# Patient Record
Sex: MALE | Race: ASIAN | NOT HISPANIC OR LATINO | Employment: OTHER | URBAN - METROPOLITAN AREA
[De-identification: names, ages, dates, MRNs, and addresses within clinical notes are randomized per-mention and may not be internally consistent; named-entity substitution may affect disease eponyms.]

---

## 2017-03-24 ENCOUNTER — OFFICE VISIT (OUTPATIENT)
Dept: FAMILY MEDICINE | Facility: CLINIC | Age: 22
End: 2017-03-24

## 2017-03-24 VITALS
SYSTOLIC BLOOD PRESSURE: 129 MMHG | HEIGHT: 61 IN | BODY MASS INDEX: 19.45 KG/M2 | WEIGHT: 103 LBS | HEART RATE: 85 BPM | DIASTOLIC BLOOD PRESSURE: 74 MMHG | TEMPERATURE: 97.7 F

## 2017-03-24 DIAGNOSIS — H90.3 BILATERAL SENSORINEURAL HEARING LOSS: Primary | ICD-10-CM

## 2017-03-24 NOTE — PROGRESS NOTES
Preceptor attestation:  Patient seen and discussed with the resident. Assessment and plan reviewed with resident and agreed upon.  Supervising physician: Dina Gardner  [facility]

## 2017-03-24 NOTE — NURSING NOTE
name: Stephan (Nick) Alison  Language: Angelica  Agency: [employer]  Phone number: [phone]

## 2017-03-24 NOTE — PROGRESS NOTES
"SUBJECTIVE:  Saumya Gerard is a 21 year old male with a PMH of    Patient Active Problem List   Diagnosis     Bilateral sensorineural hearing loss     Tinea cruris     Who presents for evaluation of   Chief Complaint   Patient presents with     other     evaluation of disability     Family needs a note from HCP confirming patient is permanently disabled for Tax information.  [facility] note from 2014 shows patient would not find benefit to communication with hearing aids.  He has been taking ASL for the last 2 years.     He is otherwise without complaint    OBJECTIVE:  /74  Pulse 85  Temp 97.7  F (36.5  C) (Oral)  Ht 5' 0.5\" (153.7 cm)  Wt 103 lb (46.7 kg)  BMI 19.78 kg/m2  EXAM:  Constitutional: healthy, alert and no distress       ASSESSMENT:  Saumya was seen today for other.    Diagnoses and all orders for this visit:    Bilateral sensorineural hearing loss  Letter given, can be found in the record.      Total of 20 minutes was spent in face to face contact with patient with > 50% in counseling and coordination of care.  Options for treatment and/or follow-up care were reviewed with the patient. Saumya Gerard was engaged and actively involved in the decision making process. He verbalized understanding of the options discussed and was satisfied with the final plan.  RTC if develops new or worsening symptoms.    Discussed with Dr Dina Gardner.     Pj Wright MD          "

## 2017-03-24 NOTE — MR AVS SNAPSHOT
After Visit Summary   3/24/2017    Saumya Gerard    MRN: [id number]           Patient Information     Date Of Birth          1995        Visit Information        Provider Department      3/24/2017 9:00 AM Pj Wright MD [facility]        Today's Diagnoses     Bilateral sensorineural hearing loss    -  1       Follow-ups after your visit        Follow-up notes from your care team     Return if symptoms worsen or fail to improve.      Who to contact     Please call your clinic at [phone] to:    Ask questions about your health    Make or cancel appointments    Discuss your medicines    Learn about your test results    Speak to your doctor   If you have compliments or concerns about an experience at your clinic, or if you wish to file a complaint, please contact [facility] Patient Relations at [phone] or email us at [email or web address]         Additional Information About Your Visit        MyChart Information     [employer] is an electronic gateway that provides easy, online access to your medical records. With Ec[employer], you can request a clinic appointment, read your test results, renew a prescription or communicate with your care team.     To sign up for BLiNQ Mediat visit the website at [email or web address]   You will be asked to enter the access code listed below, as well as some personal information. Please follow the directions to create your username and password.     Your access code is: [email or web address]  Expires: 2017  1:01 AM     Your access code will  in 90 days. If you need help or a new code, please contact your [facility] Clinic or call [phone] for assistance.        Care EveryWhere ID     This is your Care EveryWhere ID. This could be used by other organizations to access your [place] medical records  [id number]        Your Vitals Were     Pulse Temperature Height BMI (Body Mass Index)        "   85 97.7  F (36.5  C) (Oral) 5' 0.5\" (153.7 cm) 19.78 kg/m2         Blood Pressure from Last 3 Encounters:   03/24/17 129/74   05/02/16 115/70   01/05/15 131/81    Weight from Last 3 Encounters:   03/24/17 103 lb (46.7 kg)   05/02/16 109 lb 3.2 oz (49.5 kg)   01/05/15 103 lb 11.2 oz (47 kg)              Today, you had the following     No orders found for display       Primary Care Provider Office Phone # Fax #    Emil Martinez,  [phone]       [address] [address]        Thank you!     Thank you for choosing [facility]  for your care. Our goal is always to provide you with excellent care. Hearing back from our patients is one way we can continue to improve our services. Please take a few minutes to complete the written survey that you may receive in the mail after your visit with us. Thank you!             Your Updated Medication List - Protect others around you: Learn how to safely use, store and throw away your medicines at www.disposemymeds.org.          This list is accurate as of: 3/24/17 11:59 PM.  Always use your most recent med list.                   Brand Name Dispense Instructions for use    * terbinafine 250 MG tablet    lamISIL    42 tablet    Take 1 tablet (250 mg) by mouth daily       * terbinafine 250 MG tablet    lamISIL    28 tablet    Take 1 tablet (250 mg) by mouth daily       * Notice:  This list has 2 medication(s) that are the same as other medications prescribed for you. Read the directions carefully, and ask your doctor or other care provider to review them with you.      "

## 2017-03-24 NOTE — LETTER
March 24, 2017        Saumya Gerard  [address]  [place] [address]    To Whom it May Concern:    I am writing to confirm that Saumya Gerard has confirmed permanent disability.  His medical disability is well documented in the medical record.  He has been living with and dependant upon his father, Vicente Sanders, since arriving in the [place].     Sincerely,        Pj Wright MD

## 2017-05-23 ENCOUNTER — DOCUMENTATION ONLY (OUTPATIENT)
Dept: PSYCHOLOGY | Facility: CLINIC | Age: 22
End: 2017-05-23

## 2017-05-23 ENCOUNTER — OFFICE VISIT (OUTPATIENT)
Dept: FAMILY MEDICINE | Facility: CLINIC | Age: 22
End: 2017-05-23

## 2017-05-23 VITALS
HEART RATE: 92 BPM | DIASTOLIC BLOOD PRESSURE: 69 MMHG | TEMPERATURE: 98.1 F | HEIGHT: 61 IN | WEIGHT: 102 LBS | BODY MASS INDEX: 19.26 KG/M2 | SYSTOLIC BLOOD PRESSURE: 108 MMHG

## 2017-05-23 DIAGNOSIS — F43.21 SITUATIONAL DEPRESSION: Primary | ICD-10-CM

## 2017-05-23 NOTE — PATIENT INSTRUCTIONS
We will contact you with psychology resources.    Return in 1 month for follow up or sooner if things are worsening.    If these episodes occur again, try to capture on camera and bring into your next visit.        Sent  team a message to advise for MH referral.  Keisha  05/23/17

## 2017-05-23 NOTE — PROGRESS NOTES
"Preceptor attestation:  /69  Pulse 92  Temp 98.1  F (36.7  C) (Oral)  Ht 5' 0.5\" (153.7 cm)  Wt 102 lb (46.3 kg)  BMI 19.59 kg/m2    Patient seen and discussed with the resident. Assessment and plan reviewed with resident and agreed upon.    Supervising physician: Alison Schmidt MD  [facility]    "

## 2017-05-23 NOTE — MR AVS SNAPSHOT
After Visit Summary   2017    Saumya Gerard    MRN: [id number]           Patient Information     Date Of Birth          1995        Visit Information        Provider Department      2017 2:10 PM Zelda Meyer DO [facility]        Care Instructions    We will contact you with psychology resources.    Return in 1 month for follow up or sooner if things are worsening.    If these episodes occur again, try to capture on camera and bring into your next visit.        Follow-ups after your visit        Who to contact     Please call your clinic at [phone] to:    Ask questions about your health    Make or cancel appointments    Discuss your medicines    Learn about your test results    Speak to your doctor   If you have compliments or concerns about an experience at your clinic, or if you wish to file a complaint, please contact [facility] Patient Relations at [phone] or email us at [email or web address]         Additional Information About Your Visit        MyChart Information     [employer] is an electronic gateway that provides easy, online access to your medical records. With [employer]RSB Groupe, you can request a clinic appointment, read your test results, renew a prescription or communicate with your care team.     To sign up for GMG33t visit the website at [email or web address]   You will be asked to enter the access code listed below, as well as some personal information. Please follow the directions to create your username and password.     Your access code is: [email or web address]  Expires: 2017  1:01 AM     Your access code will  in 90 days. If you need help or a new code, please contact your [facility] or call [phone] for assistance.        Care EveryWhere ID     This is your Care EveryWhere ID. This could be used by other organizations to access your [place] medical records  [id number]          Blood Pressure from Last 3 Encounters:   03/24/17 129/74   05/02/16 115/70   01/05/15 131/81    Weight from Last 3 Encounters:   03/24/17 103 lb (46.7 kg)   05/02/16 109 lb 3.2 oz (49.5 kg)   01/05/15 103 lb 11.2 oz (47 kg)              Today, you had the following     No orders found for display       Primary Care Provider Office Phone # Fax #    Emil Martinez [phone]       [address] [address]        Thank you!     Thank you for choosing [facility]  for your care. Our goal is always to provide you with excellent care. Hearing back from our patients is one way we can continue to improve our services. Please take a few minutes to complete the written survey that you may receive in the mail after your visit with us. Thank you!             Your Updated Medication List - Protect others around you: Learn how to safely use, store and throw away your medicines at www.disposemymeds.org.          This list is accurate as of: 5/23/17  3:03 PM.  Always use your most recent med list.                   Brand Name Dispense Instructions for use    * terbinafine 250 MG tablet    lamISIL    42 tablet    Take 1 tablet (250 mg) by mouth daily       * terbinafine 250 MG tablet    lamISIL    28 tablet    Take 1 tablet (250 mg) by mouth daily       * Notice:  This list has 2 medication(s) that are the same as other medications prescribed for you. Read the directions carefully, and ask your doctor or other care provider to review them with you.

## 2017-05-23 NOTE — NURSING NOTE
name: Stephan (Nick) Alison  Language: Angelica  Agency: [employer]  Phone number: [phone]

## 2017-05-23 NOTE — PROGRESS NOTES
Behavioral Health Team,    Patient is being referred for mental health services. Please advise if we are able to see patient for in house treatment or if a community option would be best.    Thank you.     Keisha  Referral Coordinator

## 2017-05-24 ASSESSMENT — PATIENT HEALTH QUESTIONNAIRE - PHQ9: SUM OF ALL RESPONSES TO PHQ QUESTIONS 1-9: 3

## 2017-05-25 NOTE — PROGRESS NOTES
"Subjective:    Saumya Gerard is a 22 year old male who presents for evaluation of depression, anxiety and episodes of muscle jerking. Patient is congenitally deaf, but is learning ASL. Unfortunately,  was not arranged for this visit so brother is helping to interpret, but history is somewhat limited due to communication barrier (however brother appears to be very good at explaining things to patient). Brother tells me that three months ago his mother noticed that patient had a suddenly spasm of his upper body that he thinks occurred after an episode of anger. Patient did not lose bowel/bladder continence and was not confused after the event. Patient thinks he may have lost consciousness for a moment, but is not sure. It was a short lived event, but brother is not sure exactly how long it lasted. A similar episode occurred a month ago. Patient has no known history of seizure disorder and brother does not think he has ever had a seizure before, but family is wondering if these episodes could be seizures. Brother says that patient has been in a relationship with a  woman and this is causing the patient a lot of stress. He says the patient may have exhibited some cutting behavior about a year ago, but otherwise has not expressed any SI and has not attempted suicide. Patient is not on medication for mood and is not seeing a counselor.     ROS:   General: Denies fevers, chills.  Skin: Denies new rashes or lesions.  HEENT: Denies headache, visual disturbance, throat swelling or difficulty swallowing.  CV: Denies chest pain, palpitations or shortness of breath.  GI: Denies N/V/D.  MSK: Denies joint pain, muscles aches or difficulty ambulating.  Neurologic:+muscle spasms.    Objective:    /69  Pulse 92  Temp 98.1  F (36.7  C) (Oral)  Ht 5' 0.5\" (153.7 cm)  Wt 102 lb (46.3 kg)  BMI 19.59 kg/m2    Physical Exam:  General: NAD.  Skin: No rashes or lesions visualized.  HEENT: PERRLA, EOMI.   Heart: " Regular rhythm, no murmurs.  Lungs: Clear to auscultation b/l, no wheezes, crackles, or rales.  Abdomen: Bowel sounds X 4 quadrants, no tenderness to palpation.   MSK: Normal gait.  Lymphatic: No swelling seen.  Neurologic: CN II-XII grossly intact.    Assessment/Plan:  1. Muscle Spasms: I suppose these could represent seizures, but the history does not sound consistent with this. Neurologic exam grossly normal. I think they most likely represent a stress reaction to extreme anger and frustration. Will have him return in a 1 month for f/u or sooner if things are worsening. Recommended trying to catch an episode on video if possible so we could better characterize these episodes.    2. Situational Depression: Mental health referral placed. No SI, stable today. Will follow closely.    Zelda Meyer, PGY 2  Family Medicine Resident  [facility]

## 2017-05-31 NOTE — PROGRESS NOTES
I have left messages for the following places, will go ahead and schedule when able.         2.  Elio Parra, Ph.D.,  -Licensed Psychologist  Private Practice  [place]: [address] [facility] [place], [address], [place] [address]  [place]: [facility] [address] [facility] [address], [place] [address]  Phone: [phone] (voice)  E-mail: [email or web address]          4.  Allyn Kerr, Ph.D., L.P. - Licensed Psychologist  Private Practice  [address] [place] [address], [address], [place] [address]  TTY: [phone]  Text: [phone]  Leave message only: [phone]  E-mail: [email or web address]          [facility]  [employer]  [address] [place] [address]  Phone: [phone]   TTY: [phone]   Fax: [phone]

## 2017-05-31 NOTE — PROGRESS NOTES
Please share the following resources from the [facility] Mental Health Resource List for Deaf and Hard of Hearing Children and Adults (last updated 2014):    1. Elle Yepez M.A., [facility], [facility]-Mental Health Specialist  Deaf and Hard of Hearing Services ([facility])-[address], [address], [place] [address]  Phone: [phone], TTY: [phone], : [phone]  E-mail: [email or web address]  Website: [facility] ([email or web address])    2.  Elio Parra, Ph.D., LP -Licensed Psychologist  Private Practice  [place]: [facility] [address] [facility], [address], [place] [address]  [place]: [facility] [address] [facility] [address], [place] [address]  Phone: [phone] (voice)  E-mail: [email or web address]    3.  Denia Gilmore M.A., LP-Mental Health Therapist II  [facility] - Health and Wellness Program  [address] [place]: [address], [place] [address]  Phone: [phone] or : [phone], TTY: same or [phone]  E-mail: [email or web address]  Website: [facility] ([email or web address])    4.  Allyn Kerr, Ph.D., L.P. - Licensed Psychologist  Private Practice  [address] [place] [address], [address], [place] [address]  TTY: [phone]  Text: [phone]  Leave message only: [phone]  E-mail: [email or web address]    Other community options/resources to consider:    [facility]  [employer]  [address] [place] [address]  Phone: [phone]   TTY: [phone]   Fax: [phone]    Who we serve:    Any adult (18+) who is Deaf or Hard of Hearing or Deaf-Blind and experiences a mental illness or an emotional disorder is eligible for this program. The primary mode of communication is American Sign Language. Therefore, knowing sign language or being willing to learn is an advantage to all participants.    Services we provide:    [facility]  Drop-In Center    [facility] offers educational groups in the life skill areas of: mental health, symptom management, mental health services, substance abuse, vocational, education, social skills, interpersonal skills, self-care, medical care, dental health, finances, transportation, housing and medication education. There are also groups that focus specifically on such skills as: problem solving, values clarification, goal setting, community resources, self-awareness/awareness of others, social communication, understanding basic correspondence, and basic survival communication. In addition, [facility] has office space available by [facility] for professionals to meet with their clients, assess new clients or offer group sessions.    [facility] Outreach Services    Our staff visit individuals in their homes and provide one-to-one CADI and TBI life skills supports. We monitor participants for indications of mental health decompensation, and communicate with appropriate mental health professionals. Staff can also provide liaison between the participants and other agencies such as public housing. Participants are also encouraged to utilize the [facility] Drop-In [place] for group education, peer support, and social activities.    About our location    The [facility] drop-in center is located at [address] [place] [address] in [place] and was developed in 1996 for those who are Deaf, Hard of Hearing or Deaf-blind. We offer the opportunity and environment where individuals can meet others, receive peer support, socialize and learn. Members of [facility] will be encouraged to help plan the activities sponsored by the center.    About the process    [facility] Drop-in Center    The applicant or any professional or family/personal support member involved with the applicant can make a referral to [facility] drop-in [place]. To apply, the applicant simply needs to visit the staff at the center and fill out a brief information form with one of the staff.    [facility] Outreach  Services    Referrals to Outreach services can be made by the applicant, or any professional or family/personal support member involved with the applicant.    The individual s eligibility for the program is determined through a process which includes:  1.  the completion of an intake form, diagnostic assessment, functional assessment and individual treatment plan  2.  a visit with the applicant at their residence or at [facility]  3.  an interview with the  and/or Mental Health Practitioner.    About our fees    Services are primarily covered by Medical Assistance under: CADI waiver, TBI waivers or other waivered funding resources for those with Medical Assistance. A randee from the DHS supplements this funding for those without Medical Assistance.    About our staff    Our staff are both Deaf and hearing individuals who are fluent in sign language and have education and experience in human services. We prefer individuals with a BA or BS in social work, psychology, vocational rehabilitation or related fields. Staff are trained in CPR, First Aid, OSHA, VA, mental illness, psychiatric medications, psychiatric rehabilitation, communication skills, and behavior management.    Hours of operation    [facility] drop-in center is open Tuesday 3:00 to 9:00 p.m.and open Friday 1:00 to 9:00 p.m. Outreach services in the form of 1:1 appointments are offered Monday - Saturday (Call for hours).

## 2018-06-19 ENCOUNTER — OFFICE VISIT - HEALTHEAST (OUTPATIENT)
Dept: FAMILY MEDICINE | Facility: CLINIC | Age: 23
End: 2018-06-19

## 2018-06-19 DIAGNOSIS — Z76.89 ESTABLISHING CARE WITH NEW DOCTOR, ENCOUNTER FOR: ICD-10-CM

## 2018-06-19 DIAGNOSIS — R63.6 UNDERWEIGHT: ICD-10-CM

## 2018-06-19 DIAGNOSIS — H90.5 CONGENITAL DEAFNESS: ICD-10-CM

## 2018-06-19 DIAGNOSIS — Z00.00 ROUTINE GENERAL MEDICAL EXAMINATION AT A HEALTH CARE FACILITY: ICD-10-CM

## 2018-06-19 ASSESSMENT — MIFFLIN-ST. JEOR: SCORE: 1275.07

## 2018-07-16 ENCOUNTER — COMMUNICATION - HEALTHEAST (OUTPATIENT)
Dept: ADMINISTRATIVE | Facility: CLINIC | Age: 23
End: 2018-07-16

## 2018-09-21 ENCOUNTER — OFFICE VISIT - HEALTHEAST (OUTPATIENT)
Dept: AUDIOLOGY | Facility: CLINIC | Age: 23
End: 2018-09-21

## 2018-09-21 ENCOUNTER — OFFICE VISIT - HEALTHEAST (OUTPATIENT)
Dept: OTOLARYNGOLOGY | Facility: CLINIC | Age: 23
End: 2018-09-21

## 2018-09-21 DIAGNOSIS — H90.3 SENSORINEURAL HEARING LOSS, BILATERAL: ICD-10-CM

## 2018-09-21 DIAGNOSIS — H90.5 CONGENITAL DEAFNESS: ICD-10-CM

## 2018-09-28 ENCOUNTER — OFFICE VISIT - HEALTHEAST (OUTPATIENT)
Dept: OTOLARYNGOLOGY | Facility: CLINIC | Age: 23
End: 2018-09-28

## 2018-09-28 DIAGNOSIS — H90.5 CONGENITAL DEAFNESS: ICD-10-CM

## 2018-10-02 ENCOUNTER — OFFICE VISIT - HEALTHEAST (OUTPATIENT)
Dept: AUDIOLOGY | Facility: CLINIC | Age: 23
End: 2018-10-02

## 2018-10-02 DIAGNOSIS — H90.3 SENSORINEURAL HEARING LOSS, BILATERAL: ICD-10-CM

## 2018-10-03 ENCOUNTER — COMMUNICATION - HEALTHEAST (OUTPATIENT)
Dept: ADMINISTRATIVE | Facility: CLINIC | Age: 23
End: 2018-10-03

## 2018-10-04 ENCOUNTER — COMMUNICATION - HEALTHEAST (OUTPATIENT)
Dept: AUDIOLOGY | Facility: CLINIC | Age: 23
End: 2018-10-04

## 2018-10-22 ENCOUNTER — OFFICE VISIT - HEALTHEAST (OUTPATIENT)
Dept: AUDIOLOGY | Facility: CLINIC | Age: 23
End: 2018-10-22

## 2018-10-22 DIAGNOSIS — H90.3 SENSORINEURAL HEARING LOSS, BILATERAL: ICD-10-CM

## 2018-11-12 ENCOUNTER — OFFICE VISIT - HEALTHEAST (OUTPATIENT)
Dept: AUDIOLOGY | Facility: CLINIC | Age: 23
End: 2018-11-12

## 2018-11-12 DIAGNOSIS — H90.3 SENSORINEURAL HEARING LOSS, BILATERAL: ICD-10-CM

## 2019-02-27 ENCOUNTER — OFFICE VISIT - HEALTHEAST (OUTPATIENT)
Dept: FAMILY MEDICINE | Facility: CLINIC | Age: 24
End: 2019-02-27

## 2019-02-27 DIAGNOSIS — H90.5 CONGENITAL DEAFNESS: ICD-10-CM

## 2019-02-27 ASSESSMENT — MIFFLIN-ST. JEOR: SCORE: 1310.4

## 2019-02-28 ENCOUNTER — COMMUNICATION - HEALTHEAST (OUTPATIENT)
Dept: FAMILY MEDICINE | Facility: CLINIC | Age: 24
End: 2019-02-28

## 2019-03-20 ENCOUNTER — OFFICE VISIT - HEALTHEAST (OUTPATIENT)
Dept: FAMILY MEDICINE | Facility: CLINIC | Age: 24
End: 2019-03-20

## 2019-04-26 ENCOUNTER — COMMUNICATION - HEALTHEAST (OUTPATIENT)
Dept: FAMILY MEDICINE | Facility: CLINIC | Age: 24
End: 2019-04-26

## 2019-10-22 ENCOUNTER — OFFICE VISIT - HEALTHEAST (OUTPATIENT)
Dept: UROLOGY | Facility: CLINIC | Age: 24
End: 2019-10-22

## 2019-10-22 ENCOUNTER — AMBULATORY - HEALTHEAST (OUTPATIENT)
Dept: LAB | Facility: CLINIC | Age: 24
End: 2019-10-22

## 2019-10-22 DIAGNOSIS — N20.1 CALCULUS OF URETER: ICD-10-CM

## 2019-10-22 DIAGNOSIS — H90.5 CONGENITAL DEAFNESS: ICD-10-CM

## 2019-10-22 DIAGNOSIS — N13.2 HYDRONEPHROSIS WITH URINARY OBSTRUCTION DUE TO URETERAL CALCULUS: ICD-10-CM

## 2019-10-22 LAB
ALBUMIN UR-MCNC: NEGATIVE MG/DL
APPEARANCE UR: CLEAR
BILIRUB UR QL STRIP: NEGATIVE
CALCIUM SERPL-MCNC: 9.3 MG/DL (ref 8.5–10.5)
CALCIUM, IONIZED MEASURED: 1.23 MMOL/L (ref 1.11–1.3)
COLOR UR AUTO: YELLOW
CREAT SERPL-MCNC: 0.8 MG/DL (ref 0.7–1.3)
GFR SERPL CREATININE-BSD FRML MDRD: >60 ML/MIN/1.73M2
GLUCOSE UR STRIP-MCNC: NEGATIVE MG/DL
HGB UR QL STRIP: NEGATIVE
ION CA PH 7.4: 1.18 MMOL/L (ref 1.11–1.3)
KETONES UR STRIP-MCNC: NEGATIVE MG/DL
LEUKOCYTE ESTERASE UR QL STRIP: NEGATIVE
NITRATE UR QL: NEGATIVE
PH UR STRIP: 8.5 [PH] (ref 5–8)
PH: 7.3 (ref 7.35–7.45)
PHOSPHATE SERPL-MCNC: 2.9 MG/DL (ref 2.5–4.5)
PTH-INTACT SERPL-MCNC: 56 PG/ML (ref 10–86)
SP GR UR STRIP: 1.01 (ref 1–1.03)
URATE SERPL-MCNC: 3.5 MG/DL (ref 3–8)
UROBILINOGEN UR STRIP-ACNC: ABNORMAL

## 2019-10-25 LAB
APPEARANCE STONE: NORMAL
COMPN STONE: NORMAL
NUMBER STONE: 1
SIZE STONE: NORMAL MM
SPECIMEN WT: 22 MG

## 2019-12-17 ENCOUNTER — COMMUNICATION - HEALTHEAST (OUTPATIENT)
Dept: FAMILY MEDICINE | Facility: CLINIC | Age: 24
End: 2019-12-17

## 2019-12-17 ENCOUNTER — AMBULATORY - HEALTHEAST (OUTPATIENT)
Dept: FAMILY MEDICINE | Facility: CLINIC | Age: 24
End: 2019-12-17

## 2020-08-12 ENCOUNTER — OFFICE VISIT - HEALTHEAST (OUTPATIENT)
Dept: FAMILY MEDICINE | Facility: CLINIC | Age: 25
End: 2020-08-12

## 2020-08-12 ENCOUNTER — COMMUNICATION - HEALTHEAST (OUTPATIENT)
Dept: FAMILY MEDICINE | Facility: CLINIC | Age: 25
End: 2020-08-12

## 2020-08-12 DIAGNOSIS — H91.90 DEAFNESS, UNSPECIFIED LATERALITY: ICD-10-CM

## 2020-08-12 DIAGNOSIS — R41.3 MEMORY DIFFICULTIES: ICD-10-CM

## 2020-08-12 DIAGNOSIS — F81.9 LEARNING PROBLEM: ICD-10-CM

## 2020-08-12 DIAGNOSIS — R50.9 FEVER, UNSPECIFIED FEVER CAUSE: ICD-10-CM

## 2020-08-12 DIAGNOSIS — Z00.00 ROUTINE GENERAL MEDICAL EXAMINATION AT A HEALTH CARE FACILITY: ICD-10-CM

## 2020-09-16 ENCOUNTER — COMMUNICATION - HEALTHEAST (OUTPATIENT)
Dept: ADMINISTRATIVE | Facility: CLINIC | Age: 25
End: 2020-09-16

## 2020-09-16 DIAGNOSIS — R62.50 DEVELOPMENTAL DELAY: ICD-10-CM

## 2020-09-16 DIAGNOSIS — H90.5 CONGENITAL DEAFNESS: ICD-10-CM

## 2020-09-16 DIAGNOSIS — N20.0 KIDNEY STONE: ICD-10-CM

## 2020-09-16 DIAGNOSIS — R63.6 UNDERWEIGHT: ICD-10-CM

## 2020-11-11 ENCOUNTER — OFFICE VISIT - HEALTHEAST (OUTPATIENT)
Dept: FAMILY MEDICINE | Facility: CLINIC | Age: 25
End: 2020-11-11

## 2020-11-11 DIAGNOSIS — Z00.00 ROUTINE GENERAL MEDICAL EXAMINATION AT A HEALTH CARE FACILITY: ICD-10-CM

## 2020-11-11 DIAGNOSIS — H90.3 BILATERAL SENSORINEURAL HEARING LOSS: ICD-10-CM

## 2020-11-11 DIAGNOSIS — R63.6 UNDERWEIGHT: ICD-10-CM

## 2020-11-11 DIAGNOSIS — R62.50 DEVELOPMENTAL DELAY: ICD-10-CM

## 2020-11-11 DIAGNOSIS — H90.5 CONGENITAL DEAFNESS: ICD-10-CM

## 2020-11-11 DIAGNOSIS — F80.9 COMMUNICATION PROBLEM: ICD-10-CM

## 2020-11-11 DIAGNOSIS — Z23 NEED FOR IMMUNIZATION AGAINST INFLUENZA: ICD-10-CM

## 2020-11-11 DIAGNOSIS — K02.9 DENTAL CAVITIES: ICD-10-CM

## 2020-11-11 ASSESSMENT — MIFFLIN-ST. JEOR: SCORE: 1339.09

## 2021-05-26 VITALS — TEMPERATURE: 98 F | HEART RATE: 84 BPM | DIASTOLIC BLOOD PRESSURE: 63 MMHG | SYSTOLIC BLOOD PRESSURE: 107 MMHG

## 2021-05-28 NOTE — TELEPHONE ENCOUNTER
Please contact Serge Gerard (he is deaf and mute so a video phone will be needed). Please invite him to return to the clinic and follow up with Dr. Guillory. The appointment does not have to be soon, but it is best to plan ahead. Perhaps he could come in July or August?  (if he makes an appointment, please make it 40min).  thanks

## 2021-05-28 NOTE — TELEPHONE ENCOUNTER
CMT attempted to contact home phone number. This is not a sign language-capable videophone. CMT will forward to nursing supervisor for review as CMT is not certain how to contact this patient. [facility] does not offer signing services.

## 2021-06-01 VITALS — HEIGHT: 61 IN | BODY MASS INDEX: 18.55 KG/M2 | WEIGHT: 98.25 LBS

## 2021-06-02 VITALS — WEIGHT: 106.04 LBS | BODY MASS INDEX: 20.02 KG/M2 | HEIGHT: 61 IN

## 2021-06-02 NOTE — PROGRESS NOTES
Assessment/Plan:        Diagnoses and all orders for this visit:    Calculus of ureter  -     Urinalysis Macroscopic  -     Stone Analysis- Today; Future       Stone risk studies to be done including 24-hour urines and blood work.  Hydronephrosis with urinary obstruction due to ureteral calculus    Congenital deafness      Stone Management Plan  [facility] Stone Management 10/22/2019   Urinary Tract Infection No suspicion of infection   Renal Colic Asymptomatic at this time   Renal Failure No suspicion of renal failure   Current CT date 10/19/2019   Right sided stones? No   R Stone Event No current event   Left sided stones? Yes   L Number of ureteral stones 1   L GSD of ureteral stones 4   L Location of ureteral stone Distal   L Number of kidney stones  No renal stones   L GSD of kidney stones N/A   L Hydronephrosis Mild   L Stone Event New stone passed prior to visit   Diagnosis date 10/19/2019   Initial location of primary symptomatic stone Distal   Initial GSD of primary symptomatic stone 4         Subjective:      HPI  Mr. Saumya Gerard is a 24 y.o. Angelica, deaf male presenting to the [facility] following recent [place] ER visit for urolithiasis.    He is a first time unidentified composition stone former.  He has no identified modifiable stone risk factors. He has identified non-modifiable stone risks including:  early age at first stone.    He was seen in ER for acute onset left abdominal pain x few hours. The pain was sharp and constant, radiating to the left flank and testicle. No associated alleviating or aggravating factors. No similar pain in past. He had associated vomiting. Workup in ER was notable for CT reporting mild left hydronephrosis with a stone within bladder versus UVJ. Labs were negative for infection or renal injury. He was sent home with oxycodone.     He saw a stone pass yesterday and has brought it in. On inspection, specimen is 3 mm and light brown. He has had no further  pain since and is asymptomatic at present. He denies current symptoms of fever, chills, flank pain, nausea, vomiting, urinary frequency and dysuria.     CT scan from 10/19/19 is personally reviewed and demonstrates a 4 mm stone within bladder margin. Mild left hydronephrosis.    Significant labs from presentation include moderate hematuria, no pyuria, negative nitrite, no bacteria, normal WBC, normal creatinine and normal potassium.    PLAN    23 yo Angelica, deaf male with no previous stone disease history. Interval passage of obstructing left distal ureteral stone, specimen retrieved.    Will send stone for analysis and call patient with results.    Offered a stone prevention workup but patient declined initially but subsequently changed his mind and is willing to go ahead with 24-hour urine collections x2 and the blood work today.    Patient also seen and examined by TAMEKA Xiao   Review of Systems  A 12 point comprehensive review of systems is negative except for HPI    Past Medical History:   Diagnosis Date     Deaf     congenital - has hearing aids, but they are too loud - need adjustment     Kidney stone        Past Surgical History:   Procedure Laterality Date     NO PAST SURGERIES         Current Outpatient Medications   Medication Sig Dispense Refill     acetaminophen (TYLENOL) 500 MG tablet Take 2 tablets (1,000 mg total) by mouth 4 (four) times a day for 7 days. 56 tablet 0     ibuprofen (ADVIL,MOTRIN) 200 MG tablet Take 2 tablets (400 mg total) by mouth 4 (four) times a day for 7 days. 56 tablet 0     oxyCODONE (ROXICODONE) 5 MG immediate release tablet Every 4-6 hours as needed if pain is not improved with acetaminophen and ibuprofen. 12 tablet 0     cholecalciferol, vitamin D3, (VITAMIN D3) 2,000 unit Tab Take 1 tablet (2,000 Units total) by mouth daily. 90 tablet 4     dimenhyDRINATE (DRAMAMINE) 50 MG tablet Take 1 tablet (50 mg total) by mouth at bedtime for 7 days. 7 tablet 0      dimenhyDRINATE (DRAMAMINE) 50 MG tablet Take 1 tablet (50 mg total) by mouth 4 (four) times a day as needed. 28 tablet 0     No current facility-administered medications for this visit.        No Known Allergies    Social History     Socioeconomic History     Marital status: Single     Spouse name: Not on file     Number of children: 0     Years of education: 2     Highest education level: Not on file   Occupational History     Not on file   Social Needs     Financial resource strain: Not on file     Food insecurity:     Worry: Not on file     Inability: Not on file     Transportation needs:     Medical: Not on file     Non-medical: Not on file   Tobacco Use     Smoking status: Never Smoker     Smokeless tobacco: Never Used   Substance and Sexual Activity     Alcohol use: No     Drug use: No     Sexual activity: Never   Lifestyle     Physical activity:     Days per week: Not on file     Minutes per session: Not on file     Stress: Not on file   Relationships     Social connections:     Talks on phone: Not on file     Gets together: Not on file     Attends [employer] service: Not on file     Active member of club or organization: Not on file     Attends meetings of clubs or organizations: Not on file     Relationship status: Not on file     Intimate partner violence:     Fear of current or ex partner: Not on file     Emotionally abused: Not on file     Physically abused: Not on file     Forced sexual activity: Not on file   Other Topics Concern     Not on file   Social History Narrative    As of 3/2019        Arrival in [place]: 2013 to [place], within 2013 came to [place], [place]        Living situation: lives by himself in an apartment            Family not in [place]: none    Family in [place]: mom, dad, 2 sisters, 3 brothers (one sister lives in [place])            Past employment: 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, 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[employer], full time            Place of birth: [place]             Education: 2nd grade            Roman Catholic: Pentecostalism now, used to be Religion       Family History   Problem Relation Age of Onset     No Medical Problems Mother      No Medical Problems Father      No Medical Problems Sister      No Medical Problems Brother      No Medical Problems Sister      No Medical Problems Brother      No Medical Problems Brother        Objective:      Physical Exam  Vitals:    10/22/19 1416   BP: 107/63   Pulse: 84   Temp: 98  F (36.7  C)     General - well developed, well nourished, appropriate for age. Appears no distress at this time   Heart - regular rate and rhythm, no murmur  Respiratory - normal effort, clear to auscultation, good air entry without adventitious noises  Abdomen - slender soft, non-tender, no hepatosplenomegaly, no masses.   - no flank tenderness, no suprapubic tenderness, kidney and bladder non-palpable  MSK - normal spinal curvature. no spinal tenderness. normal gait. muscular strength intact.  Neurology - cranial nerves II-XII grossly intact, normal sensation, no unsteadiness  Skin - intact, no bruising, no gouty tophi  Psych - oriented to time, place, and person, normal mood and affect.    Labs  Urinalysis POC (Office):  Nitrite, UA   Date Value Ref Range Status   10/22/2019 Negative Negative Final   10/19/2019 Negative Negative Final       Lab Urinalysis:  Blood, UA   Date Value Ref Range Status   10/22/2019 Negative Negative Final   10/19/2019 Moderate (!) Negative Final     Nitrite, UA   Date Value Ref Range Status   10/22/2019 Negative Negative Final   10/19/2019 Negative Negative Final     Leukocytes, UA   Date Value Ref Range Status   10/22/2019 Negative Negative Final   10/19/2019 Negative Negative Final     pH, UA   Date Value Ref Range Status   10/22/2019 8.5 (H) 5.0 - 8.0 Final   10/19/2019 7.5 4.5 - 8.0 Final    and Acute Labs   CBC   WBC   Date Value Ref Range Status   10/19/2019 10.1 4.0 - 11.0 thou/uL Final     Hemoglobin   Date Value Ref Range  Status   10/19/2019 15.7 14.0 - 18.0 g/dL Final     Platelets   Date Value Ref Range Status   10/19/2019 304 140 - 440 thou/uL Final    and Renal Panel  [facility]  Creatinine   Date Value Ref Range Status   10/19/2019 1.07 0.70 - 1.30 mg/dL Final     Potassium   Date Value Ref Range Status   10/19/2019 3.7 3.5 - 5.0 mmol/L Final     Calcium   Date Value Ref Range Status   10/19/2019 9.6 8.5 - 10.5 mg/dL Final

## 2021-06-02 NOTE — PATIENT INSTRUCTIONS - HE
Calcium Oxalate Stone Prevention Self Management    Drink more fluids:    Drinking more liquids is the best way you can help prevent future stones. Stones can form when substances in the urine are too concentrated. The more you drink, the more urine you will make. This means all substances in the urine will be less concentrated.    How much urine should I be producing?    The usual recommended daily urine production is about 2 to 3 quarts (2765-0025 ml). If you are producing more than 3 quarts of urine on a regular basis, it is possible to deplete important minerals stored in the body.    To measure the amount of urine you produce in a day, you can either:    Collect all urine in a container and measure at the end of the day     Use a measuring cup each time you urinate and add up the amounts at the end of the day     Observe    Color - Dark patsy urine is concentrated. Light straw color or lighter is dilute and desirable     Odor - Concentrated urine tends to smell stronger. Dilute urine is nearly odorless    Ways to increase your fluid intake    Increasing the amount of fluid you drink is effective for all types of kidney stones. While water is commonly recommended, all fluids are effective for increasing the amount of urine your body produces.    Focus on starting a lifelong habit, rather than a short-term solution.     Keep liquids on hand that you like. Crystal Light is a low calorie appropriate choice.    Drink out of larger glasses. You'll tend to drink more with each serving.     Have an additional glass of fluid with each meal.     Keep a water or drink bottle at work and fill it regularly.     *If you are prone to fluid retention, consult your doctor before making changes to your fluid habits.    Low Oxalate Diet:    Avoid excess amounts or daily consumption of these foods:    All nuts and nut products including peanuts, almonds, pecans, peanut butter, almond milk    Rhubarb    Chocolate    Soybeans and  soy products     Spinach    Wheat Germ    Beets    Maintain a normal calcium diet:    Researches have found that people with low calcium intakes tend to have more stones. Foods with high calcium content are acceptable and include:    Dairy products (including milk, cheese and yogurt)    Meat and fish    Enriched cereals    Dark green vegetables    What about calcium supplements?     Many people take calcium supplements, either on their own or as prescribed by a doctor. Research has indicated that calcium supplements do not usually pose a risk for stone formation.  Calcium citrate is a better choice for a supplement.    Avoid excess salt:    Salt (sodium chloride) is found in abundance in many foods. High sodium levels in the urine can interfere with the kidney's handling of calcium.     Tips for reducing the salt in your diet:    Don't use salt at the table    Reduce the salt used in food preparation. Try 1/2 teaspoon when recipes call for 1 teaspoon.    Use herbs and spices for flavoring instead of salt.    Avoid salty foods.    Check the label before you buy or use a product. Note sodium and portion size information.    Try to consume less than 2,000 mg/day. (1 teaspoon = 2,000 mg)    Foods with high sodium content include:    Processed meat (including luncheon meats, sausage)     Crackers     Instant cereal     Processed cheese     Canned soups     Chips and snack foods     Soy sauce    [facility] can respond to your questions or concerns 24 hours a day at [phone].    Patient Stated Goal: Prevent further stones  Steps for collecting a 24 hour urine specimen    Please follow the directions carefully. All urine voided for a 24-hour period needs to be collected into the jug.  DO NOT change any of your  normal  daily habits when doing this test. Continue to follow your regular diet, intake of fluids, and usual activity level. Pick the most convenient day with your schedule, perhaps on a weekend  or a day off.    Start your Diet Log the day before collection and continue on the day of urine collection.  You MUST bring Diet Log with you on follow up visit to discuss results.    One 24hr Urine Collection     Two 24hr Urine Collections  (do not collect on consecutive days)    PLEASE COMPLETE THE 2nd JUG WITHIN 1-2 WEEKS FROM THE 1st JUG    STEP 1  Empty your bladder completely into the toilet. This will be your start time. Write your full legal name, start date and time on the jug label.  Collection start and stop times need to match exactly!  For example:  6 am to 6 am.    STEP 2  The next time you urinate, empty your bladder directly into the jug or collection hat and pour urine into the jug.  Screw the lid back onto the jug.  Do not spill!    STEP 3  Place the jug in the refrigerator or a cooler with ice during the collection period.  Failure to keep it cool could cause inaccurate test results. DO NOT Freeze.    STEP 4  Continue collecting all urine into the jug for the rest of the day, for the full 24 hours.  DO NOT stop early or go over 24 hours!    STEP 5  Exactly 24 hours from start of collection, write your full legal name, stop date and time on the jug label.   Collection start and stop times need to match exactly!  For example:  6 am to 6 am.  Failure to label correctly will result in recollection of urine specimen.    STEP 6  Return each jug within 24 hours after final urination.     STEP 7  Drop off jug locations:   [facility]: Mon-Fri 7am-7pm - Closed on weekends  [facility]: Mon-Fri 7am-5pm - Closed on Sunday  [facility]: Mon-Fri 7am-6:30pm - Closed on weekends    STEP 8  Please call [facility] after return of your final jug to schedule your follow-up visit. [phone]

## 2021-06-02 NOTE — PROGRESS NOTES
Patient presents to the office today for a stone consultation from [facility].

## 2021-06-04 NOTE — TELEPHONE ENCOUNTER
Attempted call no answer. Message was to please call back in order to receive flu vaccine if interested. If patient communicates to clinic please assist in scheduling a flu only appt.

## 2021-06-05 VITALS
OXYGEN SATURATION: 97 % | DIASTOLIC BLOOD PRESSURE: 60 MMHG | SYSTOLIC BLOOD PRESSURE: 96 MMHG | HEART RATE: 71 BPM | HEIGHT: 60 IN | BODY MASS INDEX: 22.33 KG/M2 | RESPIRATION RATE: 16 BRPM | WEIGHT: 113.75 LBS | TEMPERATURE: 97.3 F

## 2021-06-10 NOTE — TELEPHONE ENCOUNTER
Please can front office schedule these multiple appts for pt per PCP instructions below?  Thanks.

## 2021-06-10 NOTE — PROGRESS NOTES
"Saumya Gerard is a 25 y.o. male who is being evaluated via a billable telephone visit.      The patient has been notified of following:     \"This telephone visit will be conducted via a call between you and your physician/provider. We have found that certain health care needs can be provided without the need for a physical exam.  This service lets us provide the care you need with a short phone conversation.  If a prescription is necessary we can send it directly to your pharmacy.  If lab work is needed we can place an order for that and you can then stop by our lab to have the test done at a later time.    Telephone visits are billed at different rates depending on your insurance coverage. During this emergency period, for some insurers they may be billed the same as an in-person visit.  Please reach out to your insurance provider with any questions.    If during the course of the call the physician/provider feels a telephone visit is not appropriate, you will not be charged for this service.\"    Patient has given verbal consent to a Telephone visit? Yes    What phone number would you like to be contacted at? [phone]    Patient would like to receive their AVS by AVS Preference: Lisette.    Additional provider notes: follow up     Saumya Gerard's sister helped. The sister reported that with deafness, Saumya Gerard also has difficulty remembering. The sister believes this has been true since childhood.    No h/o TBI    He tried to take the citizenship exam in June but did not pass. He was sad. He wants to be a citizen. His sister says he has trouble remembering, so how can he pass the test?    Assessment/Plan:  1. Deafness, unspecified laterality  No change  - AMB REFERRAL TO MENTAL HEALTH AND ADDICTION  - Adult (18+); Assessment and Testing; Neuropsychological Testing; [facility] [phone]; We will contact you to schedule the appointment or please call with any questions    2. Memory " difficulties  Newly reported by family. Will seek neuropsych testing to better define this both for a medical waiver for the citizenship exam AND for expectations on his future employablity, etc.  - AMB REFERRAL TO MENTAL HEALTH AND ADDICTION  - Adult (18+); Assessment and Testing; Neuropsychological Testing; [facility] [phone]; We will contact you to schedule the appointment or please call with any questions    3. Learning problem  Reported by sister.  - AMB REFERRAL TO MENTAL HEALTH AND ADDICTION  - Adult (18+); Assessment and Testing; Neuropsychological Testing; [facility] [phone]; We will contact you to schedule the appointment or please call with any questions       neuropsych testing to better define his abilities and disabilities.      Phone call duration:  20 minutes  4:30 - 4:50  MD Radha Riddle MD

## 2021-06-10 NOTE — TELEPHONE ENCOUNTER
Please call and schedule f/u appt with Dr. Guillory once Sept and other future schedules are set up. He should be a 40min patient. I would like to see him every 6-8 weeks, and he needs an  and the family needs a Angelica . Please set up 3 appointments as it helps a lot to have future appointments set up with all the  arrangement, etc.    thanks

## 2021-06-11 NOTE — TELEPHONE ENCOUNTER
PLEASE BE SURE THIS IS ADDRESSED TODAY 9/16 by [facility]    I spoke with Ary Burk and she just found out his citizenship hearing is Monday 9/21. Thus, her appt with him 9/23 is useless as he needs to have his N648 form completed by then. She is willing to see him Saturday and do a non-verbal IQ test to help her be able to complete his N648    She urgently needs to know if the family has ASL and CDI interpreters arranged for that hearing? Ary asked if a care coordinator could help to determine this? I said I would see if someone could help (ref to Care Mgmt). If any information is obtained, please call Ary back at the [phone] number.    (Kahlil is not in the office, so do not route back to Kahlil)

## 2021-06-11 NOTE — TELEPHONE ENCOUNTER
[facility] from Dr. Guillory to discuss pt. He has an upcoming appt with her on 9/23 9am, ASL interp has been confirmed. However, when they confirmed the appt with pt, he stated that his interview for immigration is Monday 9/21. She said she could open up her clinic on Saturday and have the pt come in to see her so he can make it to his interview Monday but there's no way Dr. Burk can confirm a ASL interp. Please call her back at [phone]. Thanks.

## 2021-06-12 NOTE — TELEPHONE ENCOUNTER
LOLI called with Angelica  and spoke with brother. The patient went to the interview and did not pass the test.     Physical scheduled.

## 2021-06-12 NOTE — TELEPHONE ENCOUNTER
Please call Saumya Gerard (but you'll likely have to talk to a family member because he is deaf and requires a CDI and an ) and ask what is going on with his citizenship process?  Also, please help him to schedule an appointment with me before the end of the year. Ideally, that appointent would be a general physical.

## 2021-06-18 NOTE — LETTER
Letter by Radha Guillory MD at      Author: Radha Guillory MD Service: -- Author Type: --    Filed:  Encounter Date: 2/27/2019 Status: (Other)       March 1, 2019     Patient: Saumya Gerard   YOB: 1995   Date of Visit: 2/27/2019       To Whom it May Concern:    Saumya Gerard was seen in my clinic on 2/27/2019.    If you have any questions or concerns, please don't hesitate to call.    Sincerely,         Electronically signed by Radha Guillory MD

## 2021-06-18 NOTE — PROGRESS NOTES
MALE PREVENTATIVE EXAM    Assessment and Plan:       1. Congenital deafness  Has hearing aids but they are too loud  - Ambulatory referral to ENT  - Ambulatory referral to Audiology    2. Routine general medical examination at a health care facility  Generally healthy  - acetaminophen (TYLENOL) 325 MG tablet; Take 2 tablets (650 mg total) by mouth every 4 (four) hours as needed for pain.  Dispense: 100 tablet; Refill: 2  - cholecalciferol, vitamin D3, (VITAMIN D3) 2,000 unit Tab; Take 1 tablet (2,000 Units total) by mouth daily.  Dispense: 90 tablet; Refill: 4    3. Establishing care with new doctor, encounter for  Moved closer to [place]  4. Underweight    Next follow up:  No Follow-up on file.    Immunization Review  Adult Imm Review: Due today, orders placed  n/a    I discussed the following with the patient:   Adult Healthy Living: Importance of regular exercise  Healthy nutrition  Stress management        Subjective:   Chief Complaint: Saumya Gerard is an 23 y.o. male here for a preventative health visit.     HPI:  Wants citizenship waiver AND to get care at [place] now. They moved close to [place] and want to establish care here.  With , he thinks he could take the citizenship exam.    Healthy Habits  Are you taking a daily aspirin? No  Do you typically exercising at least 40 min, 3-4 times per week?  Yes  Do you usually eat at least 4 servings of fruit and vegetables a day, include whole grains and fiber and avoid regularly eating high fat foods? Yes  Have you had an eye exam in the past two years? Yes  Do you see a dentist twice per year? NO  Do you have any concerns regarding sleep? No    Safety Screen  If you own firearms, are they secured in a locked gun cabinet or with trigger locks? The patient does not own any firearms  Do you feel you are safe where you are living?: Yes (6/19/2018  7:10 AM)    Review of Systems:  Please see above.  The rest of the review of systems are negative for all  "systems.    His brother noted at the end of the visit that Saumya Gerard used to be chubby but with working he's lost a lot of weight.    Cancer Screening     Patient has no health maintenance due at this time          History     Reviewed By Date/Time Sections Reviewed    Beth Gonzalez MA 6/19/2018  7:10 AM Tobacco, Alcohol, Drug Use, Sexual Activity            Objective:   Vital Signs:   Visit Vitals     /64 (Patient Site: Left Arm, Patient Position: Sitting, Cuff Size: Adult Regular)     Pulse 64     Temp 97.7  F (36.5  C) (Oral)     Resp 20     Ht 5' 0.75\" (1.543 m)     Wt (!) 98 lb 4 oz (44.6 kg)     SpO2 99%  Comment: RA     BMI 18.72 kg/m2          PHYSICAL EXAM  Physical Exam:  General Appearance: Alert, cooperative, no distress, appears stated age  Head: Normocephalic, without obvious abnormality, atraumatic  Eyes: PERRL, conjunctiva/corneas clear, EOM's intact  Ears: Normal right TM and external ear canal, left canal is tortuous and TM a bit irregular  Nose: Nares normal, septum midline,mucosa normal, no drainage  Throat: Lips, mucosa, and tongue normal; teeth and gums normal  Neck: Supple, symmetrical, trachea midline, no adenopathy;  thyroid: not enlarged, symmetric, no tenderness/mass/nodules  Back: Symmetric, no curvature, ROM normal, no CVA tenderness  Lungs: Clear to auscultation bilaterally, respirations unlabored  Heart: Regular rate and rhythm, S1 and S2 normal, no murmur  Abdomen: Soft, non-tender, bowel sounds active,  no masses, no organomegaly  Extremities: Extremities normal, atraumatic, no cyanosis or edema  Skin: Skin color, texture, turgor normal, no rashes or lesions  Neurologic: Normal              Medication List          These changes are accurate as of 6/19/18  9:57 PM.  If you have any questions, ask your nurse or doctor.               START taking these medications          acetaminophen 325 MG tablet   Also known as:  TYLENOL   INSTRUCTIONS:  Take 2 tablets (650 mg total) by " mouth every 4 (four) hours as needed for pain.   Started by:  Radha Washington MD           cholecalciferol (vitamin D3) 2,000 unit Tab   Also known as:  VITAMIN D3   INSTRUCTIONS:  Take 1 tablet (2,000 Units total) by mouth daily.   Started by:  Radha Washington MD                Where to Get Your Medications      These medications were sent to [facility] - [place], [place] - [address], [place] [address]     Phone:  [phone]      acetaminophen 325 MG tablet     cholecalciferol (vitamin D3) 2,000 unit Tab

## 2021-06-20 NOTE — PROGRESS NOTES
Hearing Aid Check    Saumya Gerard returns today for a hearing aid check. He is accompanied by his brother, a Angelica , and an . He brings in a pair of 2014 Phonak Bernadine Q50 BTE hearing aids from [facility]  coupled to acrylic skeleton earmolds. He reports he has not worn the hearing aids because he doesn't find them helpful.      Otoscopy:  clear canals in both ears  Data Logging: minimal use  Visual inspection: Earmolds with a somewhat loose fit.   Action:  Real-ear speechmapping of the hearing aids revealed aids fit well below NAL-NL2 prescriptive targets. Hearing aids initially set at 80% gain. Increased to 100% with feedback noted. Re-ran feedback manager. Able to increase volume bilaterally to 100%, but then had to decrease the overall gain by 4 steps bilaterally due to feedback. Re-ran speechmapping with hearing aids still below target.     Plan: Will request a benefits check to determine if patient is eligible for new hearing aids that can achieve a better fit to targets. Patient prefers black BTE hearing aids.  New silicone skeleton earmolds are also recommended. Earmold impressions taken bilaterally without incident and will be held.  Will call patient at phone number listed, which is patient's sister-in-law who speaks English, once a decision has been made from his insurance.      He will follow up in 1 year or as needed.    Audrey Hernandez, CCC-A  [place] Licensed Audiologist #[id number]

## 2021-06-20 NOTE — PROGRESS NOTES
Audiology Report    Referring Provider:  Radha Guillory MD    History:  Saumya Gerard is seen in conjunction with ENT appointment today. He is accompanied by his brother and an . The patient reports he was born with hearing loss. He has a pair of Phonak BTE hearing aids from [facility] from 2014. He reports the hearing aids have not been helpful, and that he doesn't wear them. It is difficult to determine when the patient started using amplification.     Results:     Left Ear Right Ear   Otoscopy clear canals clear canals   Pure Tone Audiometry Profound sensorineural hearing loss     Profound sensorineural hearing loss     Word Recognition DNT as communicates via ASL DNT as communicates via ASL   Tympanometry normal (Type A)  normal (Type A)     Transducer: Insert earphones    Reliability was good . A speech awareness threshold obtained at 100 dB HL which is in general agreement with the patient's hearing test.     Plan:  The patient is returned to ENT for follow up.  He should return for retesting with ENT recommendation. Scheduled patient to return for a hearing aid check to assess programming of his hearing aid. Will need to have discussion regarding realistic expectations for the hearing aids given the severity and duration his his profound hearing loss. The  reported difficulty communicating with the patient due to a communication breakdown. A Certified  is recommended for future appointments.     Please see audiogram under  media  and  audiogram  in the patient s chart.     Audrey Hernandez, CCC-A  [place] Licensed Audiologist #[id number]

## 2021-06-20 NOTE — PROGRESS NOTES
HPI:  He is here with DELMI Dominguez and CDI . We again reviewed his history which is the same as his previous visit.      Past medical history, surgical history, social history, family history, medications, and allergies have been reviewed with the patient and are documented above.    Review of Systems: a 10-system review was performed. Pertinent positives are noted in the HPI and on a separate scanned document in the chart.    PHYSICAL EXAMINATION:  GEN: no acute distress, normocephalic  EYES: extraocular movements are intact, pupils are equal and round. Sclera clear.   EARS: auricles are normally formed. The external auditory canals are clear with minimal to no cerumen. Tympanic membranes are intact bilaterally with no signs of infection, effusion, retractions, or perforations.  NOSE: aNEURO: CN II-XII are intact bilaterally. alert and oriented. No nystagmus. Gait is normal.  PULM: breathing comfortably on room air, normal chest expansion with respiration  HEART: regular rate and rhythm, no peripheral edema    MEDICAL DECISION-MAKING:   Given the auditory deprivation, CI is likely not beneficial.  He is interested in trying hearing aids again and this may give him some sound awareness. Recommend annual audiogram.

## 2021-06-20 NOTE — PROGRESS NOTES
Saumya Gerard is a 23 y.o. male seen in consultation at the request of Dr. Guillory for Hearing loss. Patient has had congenital hearing loss and never has had any hearing.  He notes that he has never communicated orally.  He did obtain BTE hearing aids in 2014 for which he gets very little benefit.  He communicates today via , however she notes they are having a hard time communicating and she recommends the addition of a CDI  as well.      ALLERGY:  No Known Allergies    MEDICATIONS:     Current Outpatient Prescriptions on File Prior to Visit   Medication Sig Dispense Refill     acetaminophen (TYLENOL) 325 MG tablet Take 2 tablets (650 mg total) by mouth every 4 (four) hours as needed for pain. 100 tablet 2     cholecalciferol, vitamin D3, (VITAMIN D3) 2,000 unit Tab Take 1 tablet (2,000 Units total) by mouth daily. 90 tablet 4     No current facility-administered medications on file prior to visit.        Past Medical/Surgical History, Family History and Social History reviewed in detail and documented separately in the medical record.    Complete Review of Systems:  A 10-point review was performed.  Pertinent positives are noted in the HPI and on a separate scanned document in the chart.    Family History not pertinent to the to Chief Complaint or presenting problem    EXAM:  There were no vitals filed for this visit.    Nurse documentation reviewed  and documented separately.    General Appearance: Pleasant, alert, appropriate appearance for age. No acute distress    Head Exam: Normal. Normocephalic, atraumatic.    Eye Exam: Normal external eye, conjunctiva, lids, cornea. Extra-ocular movements are intact.    Left external ear: normal  Left otoscopic exam: Normal EAC. Normal TM     Right external ear: normal  Right otoscopic exam: Normal EAC. Normal TM    Nose Exam: Normal external nose. Septum midline. Nasal mucosa normal.  Inferior turbinates normal.    OroPharynx Exam: Dental hygiene  adequate. Normal tongue. Normal buccal mucosa. Normal palate.  Normal pharynx. Normal tonsils.    Neck Exam: Supple, no masses or nodes. Trachea and larynx midline.    Thyroid Exam: No tenderness, nodules or enlargement.    Salivary Glands: nontender without masses    Skin:  Intact    Neuro: Alert and oriented times 3, CN 2-12 grossly intact, no nystagmus, PERRL, EOMI, normal speech and gait    Chest/Respiratory Exam: Normal chest wall motion and respiratory effort. No audible stridor or wheezing.    Cardiovascular Exam: Regular rate and rhythm.  No cyanosis, clubbing or edema.    Pulses: carotid pulses normal    Mood:  Pleasant, cooperative    ASSESSMENT:  Profound sensorineural hearing loss    PLAN: Findings, assessment, and management options were discussed.  I am doubtful that hearing aids are doing much for him.  Cochlear implant is not a great option either as he is postlinguallly deafened and has had at least 20 years of auditory deprivation.  Given the difficulty in communication, and that I would like to very clearly communicate why cochlear implants may not be a good option, we will arrange another visit with a [facility]  to make sure we are communicating as well as possible.

## 2021-06-21 NOTE — PROGRESS NOTES
Hearing Aid Check    Saumya Gerard returns today for a hearing aid check.  He is accompanied by his brother, a Angelica , a  and a Certified . He reports at first he felt the hearing aids were very noisy but now finds they are helpful for localizing background noise. He reports he has found them helpful and would like to keep them.     Data Logging: minimal use  Visual inspection: Saumya Gerard arrives with hearing aids in place  Action:  Attempted to connect to programming software, however battery level too low bilaterally. Counseled patient on datalogging which revealed two hours of use since receiving the hearing aids. He feels this may not be accurate as he wears them most days. Reviewed the lifespan of batteries and how to change batteries. Increased overall gain by 3 steps from 3210-6386 Hz.     He reports subjective improvement with today s changes. He will follow up in 3 months or as needed. His brother will call for more batteries in January.     Audrey Hernandez, CCC-A  [place] Licensed Audiologist #[id number]

## 2021-06-21 NOTE — LETTER
Letter by Radha Guillory MD at      Author: Radha Guillory MD Service: -- Author Type: --    Filed:  Encounter Date: 11/11/2020 Status: (Other)         November 11, 2020     Patient: Saumya Gerard   YOB: 1995   Date of Visit: 11/11/2020       To Whom it May Concern:    Saumya Gerard was seen in my clinic on 11/11/2020.    If you have any questions or concerns, please don't hesitate to call.    Sincerely,         Electronically signed by Radha Guillory MD

## 2021-06-21 NOTE — PROGRESS NOTES
Hearing Aid Fitting    The patient was seen today for a hearing aid fitting. He is accompanied today by his brother, a Angelica intepreter, a , and a certified .  He has been medically cleared for devices from Dr. Galvin. He has previously used a pair of 2014 Phonak Bernadine Q50 BTE hearing aids that did not fit NAL-NL2 targets.  He was fit today with bilateral Phonak Bernadine B50 BTE devices coupled to new [employer] silicon earmolds.   Real ear measurements revealed a good fit to NAL-NL2 targets from 500-2000 Hz bilaterally at 100% of gain. SoundRecover 2 is enabled.    Hearing aid:  : Phonak  Devices:  Bernadine B50-UP in velvet black  Style:  BTE  Serial numbers:  R: [id number], L: [id number]  Batteries:  675. A 90 day supply of size 675 batteries are dispensed.     Otoscopy reveals no occlusions, bilaterally.  The patient reports satisfaction with the fit and sound quality of the instruments Saumya Gerard prefers that the hearing aid gain be lowered to 80% as he finds anything louder to be too loud.   Use, care, trial period and realistic expectations were reviewed in detail.    He is able to demonstrate independent manipulation of the instruments with battery care and insertion/removal.  He is given written instruction on use, care, and warranty.  He sets up a follow up appointment in 3 weeks.    The patient verbalized understanding and is in agreement with this plan.    Audrey Hernandez, CCC-A  [place] Licensed Audiologist #[id number]

## 2021-06-24 NOTE — PROGRESS NOTES
This appointment was very difficult from the provider's perspective. Exactly what needed to happen to help the patient to be able to take the citizenship exam was under discernment and there was some learning/sharing between the provider, the interpreters and the patient and his brother. The CDI  shared a little from his experience helping people do the citizenship interview. In the provider's opinion, the  added to what was said by the provider to the patient, she repeatedly stated that she refused to sign the N648 form - yet when she asked what she was signing for, she did not listen to the explanation (x 2 or more), and she pointed out strong disagreement with language used on the form and blamed the provider for using that language (speak English). At the end of the visit, the provider was uncertain of the impact of all this on the patient.              OFFICE VISIT NOTE      Assessment & Plan   Saumya Gerard is a 23 y.o. male who wants to become a [place] citizen. We will help him, providing what's needed (eg CDI and ASL interpreters), as necessary, for him to take and pass the exam.      Diagnoses and all orders for this visit:    Congenital deafness        Radha Guillory MD              Subjective:   Chief Complaint:  Form (citizenship waiver)    23 y.o. male.     1) tried taking the citizenship exam once - failed. Wants to take it and pass. They gave him the N648 form when he failed and told him to have his doctor fill it out for him. What is unclear is if they said the form would be to excuse him from taking the exam or not. He wants to learn ASL and be able to know the information the test is on.    2) when asked specifically, he states he cannot and will not be able to speak English (the purpose/reason for this question is within the N648 form).    Current Outpatient Medications   Medication Sig     acetaminophen (TYLENOL) 325 MG tablet Take 2 tablets (650 mg total) by mouth every 4  "(four) hours as needed for pain.     cholecalciferol, vitamin D3, (VITAMIN D3) 2,000 unit Tab Take 1 tablet (2,000 Units total) by mouth daily.       PSFHx: appropriate sections of PMH completed/filled in   Tobacco Status:  He  reports that  has never smoked. he has never used smokeless tobacco.    Review of Systems:  No fever. All other systems negative except as noted above.    Objective:    BP (!) 86/54   Pulse 68   Temp 97.7  F (36.5  C) (Oral)   Resp 12   Ht 5' 0.75\" (1.543 m)   Wt 106 lb 0.6 oz (48.1 kg)   BMI 20.20 kg/m    GENERAL: No acute distress, although the visit was somewhat tense  Saumya showed me his name sign and he participated in the visit whenever he was asked a question.    Spent 40 min face to face with patient with more the 50% spent in counseling, reviewing chart, and coordination of care and discussing Saumya's deafness, needs in order to better prepare for the citizenship exam, supports we can offer.    "

## 2021-06-24 NOTE — TELEPHONE ENCOUNTER
Attempted to fax work note, but number provided did not work. Called pt to confirm fax number. Pt was unavailable but spoke to brother. Brother stated if he still needs the work note that he will call [phone].

## 2021-06-24 NOTE — TELEPHONE ENCOUNTER
Who is calling:  Patient Via interp  Reason for Call:  Patient seen yesterday, missed work, requesting note to employer stating she was seen in clinic, Please Fax  to [phone]  Neela Boland     Date of last appointment with primary care: 2/27/19  Has the patient been recently seen:  Yes  Okay to leave a detailed message:  No call back needed    F ax   [phone]  Neela Boland

## 2021-06-29 NOTE — PROGRESS NOTES
Progress Notes by Radha Guillory MD at 11/11/2020 12:00 PM     Author: Radha Guillory MD Service: -- Author Type: Physician    Filed: 11/11/2020  7:49 PM Encounter Date: 11/11/2020 Status: Signed    : Radha Guillory MD (Physician)       MALE PREVENTATIVE EXAM    Assessment and Plan:   26yo male who is congenitally deaf here for a physical exam. He is generally well, but in his home, there is a lot of potential for mis-understanding due to lack of communication. His brother agrees to try to learn to sign for better communication. I suggested using [employer], a place for deaf people and their friends and family.    Saumya does not eat many vegetables and needs to increase that and fruit intake. He agrees to work on this for his long-term health.    Needs to see a dentist for general care and cavities.    Saumya was seen today for annual exam.    Routine general medical examination at a health care facility    Need for immunization against influenza  -     Influenza, Seasonal Quad, PF =/> 6months    Dental cavities  -     Ambulatory referral to Dentistry    Bilateral sensorineural hearing loss    Developmental delay    Underweight    Congenital deafness    Communication problem  Comments:  no one he lives with can sign much, so there is a good chance for mis-understandings; his brother, Anderson, might learn more sign through [employer] (as of 11/2020        Next follow up:  No follow-ups on file.    Immunization Review  Adult Imm Review: Due today, orders placed    I discussed the following with the patient:   Adult Healthy Living: Importance of regular exercise  Healthy nutrition  Getting adequate sleep  Stress management  Use of seat belts                 Subjective:   Chief Complaint: Saumya Gerard is an 25 y.o. male here for a preventative health visit.    HPI:  He says he's doing well, is fine.  When asked about sleep, he says he falls asleep about 9pm - wakes 6am, alert and ready for the  "day.    Still works part time at [employer]. One co-worker helps at times (she's deaf and they write back and forth).    Healthy Habits  Are you taking a daily aspirin? No  Do you typically exercising at least 40 min, 3-4 times per week?  Yes more so in the summer. Not so much in winter time.   Do you usually eat at least 4 servings of fruit and vegetables a day, include whole grains and fiber and avoid regularly eating high fat foods? NO - eggs and asian food. Chicken, fish and meat. Noodles and rice. Rarely eats vegetables.   Have you had an eye exam in the past two years? NO  Do you see a dentist twice per year? NO  Do you have any concerns regarding sleep? No \"I wake up\" - Sleeps well, no trouble.     Safety Screen  If you own firearms, are they secured in a locked gun cabinet or with trigger locks? The patient does not own any firearms  Do you feel you are safe where you are living?: Yes (11/11/2020 12:20 PM)  Do you feel you are safe in your relationship(s)?: Yes (11/11/2020 12:20 PM)      Review of Systems:  Please see above.  The rest of the review of systems are negative for all systems.     Cancer Screening     Patient has no health maintenance due at this time              History     Reviewed By Date/Time Sections Reviewed    Radha Guillory MD 11/11/2020 12:54 PM Surgical    EsLizzie haro MA 11/11/2020 12:34 PM Tobacco, Alcohol, Drug Use            Objective:   Vital Signs:   Visit Vitals  BP 96/60   Pulse 71   Temp 97.3  F (36.3  C) (Axillary)   Resp 16   Ht 5' 0.35\" (1.533 m)   Wt 113 lb 12 oz (51.6 kg)   SpO2 97%   BMI 21.96 kg/m           PHYSICAL EXAM  General Appearance: Alert, cooperative, no distress, appears stated age  Head: Normocephalic, without obvious abnormality, atraumatic  Eyes: PERRL, conjunctiva/corneas clear, EOM's intact  Ears: TM's clear and retracted,external ear canals with mild amount of cerumen, both ears  Nose: Nares clear, septum midline,mucosa pink and " moist, no drainage  Throat: Lips, mucosa, and tongue moist without lesions; teeth clean  Neck: Supple, symmetrical, trachea midline, no adenopathy;  thyroid: not enlarged, symmetric, no tenderness/mass/nodules  Back: Symmetric, no curvature, ROM normal, no CVA tenderness  Lungs: Clear to auscultation bilaterally, respirations unlabored  Heart: Regular rate and rhythm, S1 and S2 normal, no murmur  Abdomen: Soft, non-tender, bowel sounds active,  no masses, no organomegaly  Extremities: Extremities have symmetric bulk and tone, atraumatic, no cyanosis or edema  Skin: no rashes or lesions, warm; upper left back has a large tattoo on it  Neurologic: smooth coordination during exam, +2/4 DTRs in patellar tendons             Medication List          Accurate as of November 11, 2020  7:49 PM. If you have any questions, ask your nurse or doctor.            CONTINUE taking these medications    acetaminophen 500 MG tablet  Also known as: Tylenol Extra Strength  INSTRUCTIONS: Take 2 tablets (1,000 mg total) by mouth every 6 (six) hours as needed for pain.        cholecalciferol (vitamin D3) 50 mcg (2,000 unit) Tab  Also known as: Vitamin D3  INSTRUCTIONS: Take 1 tablet (2,000 Units total) by mouth daily.        Sleep Aid (diphenhydrAMINE) 50 MG capsule  INSTRUCTIONS: TAKE 1 CAPSULE BY MOUTH AT BEDTIME FOR 7 DAYS  Generic drug: diphenhydrAMINE               Additional Screenings Completed Today:

## 2021-07-14 PROBLEM — F43.21 SITUATIONAL DEPRESSION: Status: RESOLVED | Noted: 2017-05-23 | Resolved: 2020-11-11

## 2022-02-10 ENCOUNTER — TRANSFERRED RECORDS (OUTPATIENT)
Dept: HEALTH INFORMATION MANAGEMENT | Facility: CLINIC | Age: 27
End: 2022-02-10

## 2023-02-27 ENCOUNTER — OFFICE VISIT (OUTPATIENT)
Dept: FAMILY MEDICINE | Facility: CLINIC | Age: 28
End: 2023-02-27
Payer: COMMERCIAL

## 2023-02-27 VITALS
WEIGHT: 132 LBS | HEART RATE: 77 BPM | SYSTOLIC BLOOD PRESSURE: 127 MMHG | OXYGEN SATURATION: 97 % | DIASTOLIC BLOOD PRESSURE: 82 MMHG | BODY MASS INDEX: 25.48 KG/M2

## 2023-02-27 DIAGNOSIS — H90.3 BILATERAL SENSORINEURAL HEARING LOSS: Primary | ICD-10-CM

## 2023-02-27 PROCEDURE — 99213 OFFICE O/P EST LOW 20 MIN: CPT | Performed by: FAMILY MEDICINE

## 2023-02-27 NOTE — PROGRESS NOTES
Preceptor Attestation:   I was present with the medical student who participated in the service and in the documentation of this note. I have verified the history and personally performed the physical exam and medical decision making. I have verified the content of the note, which accurately reflects my assessment of the patient and the plan of care.   Supervising Physician:  Missael Schulte MD.

## 2023-02-27 NOTE — LETTER
RETURN TO WORK/SCHOOL FORM    2/27/2023    Re: Saumya Gerard  1995      To Whom It May Concern:     Saumya Gerard was seen in clinic today.  He may return to work without restrictions on 2/28/23          Restrictions:  None            Missael Schulte MD  2/27/2023 9:08 AM

## 2023-02-27 NOTE — LETTER
2/27/2023    Re: Saumya Gerard  1995      To Whom It May Concern:     Saumya Gerard was seen in clinic today.  He has longstanding bilateral sensorineural hearing loss that is well documented.  Please contact us with further questions.    Sincerely,          Missael Schulte MD  2/27/2023 9:05 AM

## 2023-02-27 NOTE — PROGRESS NOTES
Assessment & Plan     Bilateral sensorineural hearing loss  Patient has history of well-document bilateral congenital deafness. He presents to clinic today requesting a letter verifying hearing loss; he was recently denied a waiver for citizenship and they are requesting documentation.    - Letter provided to patient today             Kelly Renata, MS3  [employer]     Missael Schulte MD  [facility] RAKAN Kerns is a 27 year old accompanied by his sister and brother, presenting for the following health issues:  Forms (Needs a letter saying he is deaf)    JOSUÉ Kerns is a 27 year old male with a history of congenital deafness presenting to clinic requesting a letter. He is accompanied by his brother and sister who are assisting with interpretation. They state he was denied a waiver for citizenship as he could previously pass an english test but is now unable to. He requires a letter documenting his history of congenital deafness.     Review of Systems   Constitutional, HEENT, cardiovascular, pulmonary, gi and gu systems are negative, except as otherwise noted.      Objective    /82 (BP Location: Left arm, Patient Position: Sitting, Cuff Size: Adult Regular)   Pulse 77   Wt 59.9 kg (132 lb)   SpO2 97%   BMI 25.48 kg/m    Body mass index is 25.48 kg/m .  Physical Exam   GENERAL: healthy, alert and no distress  EYES: Eyes grossly normal to inspection, PERRL and conjunctivae and sclerae normal  NECK: no asymmetry, masses, or scars  RESP: breathing comfortably on room air, no increased work of breathing  CV: well-perfused  MS: no gross musculoskeletal defects noted, no edema  SKIN: no suspicious lesions or rashes